# Patient Record
(demographics unavailable — no encounter records)

---
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
## 2024-11-07 NOTE — ASSESSMENT
[FreeTextEntry1] : Discussed the history, physical examination findings, and recent imaging with the patient and his mother.  The patient will continue with the cervical brace immobilization for the next 4 weeks.  Updated CT scan to be obtained prior to the next office visit.  Will most likely discontinue the brace after review of the imaging.

## 2024-11-07 NOTE — PHYSICAL EXAM
[General Appearance - Alert] : alert [General Appearance - In No Acute Distress] : in no acute distress [General Appearance - Well Nourished] : well nourished [General Appearance - Well Developed] : well developed [General Appearance - Well-Appearing] : healthy appearing [] : normal voice and communication [Oriented To Time, Place, And Person] : oriented to person, place, and time [Impaired Insight] : insight and judgment were intact [Affect] : the affect was normal [Mood] : the mood was normal [Memory Recent] : recent memory was not impaired [Memory Remote] : remote memory was not impaired [Person] : oriented to person [Place] : oriented to place [Time] : oriented to time [Motor Tone] : muscle tone was normal in all four extremities [5] : C8 finger flexors 5/5 [Abnormal Walk] : normal gait

## 2024-11-07 NOTE — DATA REVIEWED
[de-identified] : CT scan/MRI was reviewed of the cervical spine revealing a nondisplaced left C7 transverse process fracture

## 2024-12-04 NOTE — DATA REVIEWED
[de-identified] : CT scan/MRI was reviewed of the cervical spine revealing a nondisplaced left C7 transverse process fracture
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
## 2024-12-04 NOTE — ASSESSMENT
[FreeTextEntry1] : Discussed the history, physical examination findings, and recent imaging with the patient and his mother.  The cervical spine imaging has been reviewed with the patient.  At this time, the cervical brace may be removed.  Physical therapy prescription provided for some gentle range of motion.  The patient may follow-up in 6 weeks to note his progress, otherwise prn.